# Patient Record
Sex: MALE | Race: BLACK OR AFRICAN AMERICAN | Employment: FULL TIME | URBAN - METROPOLITAN AREA
[De-identification: names, ages, dates, MRNs, and addresses within clinical notes are randomized per-mention and may not be internally consistent; named-entity substitution may affect disease eponyms.]

---

## 2018-02-15 ENCOUNTER — HOSPITAL ENCOUNTER (EMERGENCY)
Age: 28
Discharge: LEFT BEFORE DISCHARGE | End: 2018-02-15
Attending: EMERGENCY MEDICINE
Payer: COMMERCIAL

## 2018-02-15 VITALS
DIASTOLIC BLOOD PRESSURE: 75 MMHG | SYSTOLIC BLOOD PRESSURE: 154 MMHG | BODY MASS INDEX: 25.92 KG/M2 | HEIGHT: 73 IN | RESPIRATION RATE: 16 BRPM | TEMPERATURE: 98.4 F | WEIGHT: 195.55 LBS | OXYGEN SATURATION: 100 %

## 2018-02-15 LAB
GLUCOSE BLD STRIP.AUTO-MCNC: 87 MG/DL (ref 65–100)
SERVICE CMNT-IMP: NORMAL

## 2018-02-15 PROCEDURE — 82962 GLUCOSE BLOOD TEST: CPT

## 2018-02-15 PROCEDURE — 75810000275 HC EMERGENCY DEPT VISIT NO LEVEL OF CARE

## 2018-02-15 NOTE — ED TRIAGE NOTES
Called pt on phone number listed on facesheet Pt states he just left ED Pt aware that PA was ready to evaluate pt if he wished to come back in and be seen Pt states \"No, I had other things to take care of.\"

## 2019-02-08 ENCOUNTER — APPOINTMENT (OUTPATIENT)
Dept: ULTRASOUND IMAGING | Age: 29
End: 2019-02-08
Attending: EMERGENCY MEDICINE
Payer: SELF-PAY

## 2019-02-08 ENCOUNTER — HOSPITAL ENCOUNTER (EMERGENCY)
Age: 29
Discharge: HOME | End: 2019-02-08
Attending: EMERGENCY MEDICINE
Payer: SELF-PAY

## 2019-02-08 ENCOUNTER — OFFICE VISIT (OUTPATIENT)
Dept: URGENT CARE | Age: 29
End: 2019-02-08

## 2019-02-08 VITALS
TEMPERATURE: 98.3 F | OXYGEN SATURATION: 100 % | SYSTOLIC BLOOD PRESSURE: 136 MMHG | WEIGHT: 192.46 LBS | HEIGHT: 72 IN | DIASTOLIC BLOOD PRESSURE: 66 MMHG | RESPIRATION RATE: 16 BRPM | HEART RATE: 63 BPM | BODY MASS INDEX: 26.07 KG/M2

## 2019-02-08 VITALS
DIASTOLIC BLOOD PRESSURE: 75 MMHG | HEART RATE: 86 BPM | BODY MASS INDEX: 25.45 KG/M2 | SYSTOLIC BLOOD PRESSURE: 131 MMHG | RESPIRATION RATE: 18 BRPM | TEMPERATURE: 97.5 F | HEIGHT: 73 IN | WEIGHT: 192 LBS | OXYGEN SATURATION: 97 %

## 2019-02-08 DIAGNOSIS — N45.1 EPIDIDYMITIS: Primary | ICD-10-CM

## 2019-02-08 DIAGNOSIS — N50.811 PAIN IN RIGHT TESTICLE: Primary | ICD-10-CM

## 2019-02-08 LAB
APPEARANCE UR: CLEAR
BILIRUB UR QL STRIP: NEGATIVE
BILIRUB UR QL: NEGATIVE
COLOR UR: NORMAL
GLUCOSE UR STRIP.AUTO-MCNC: NEGATIVE MG/DL
GLUCOSE UR-MCNC: NEGATIVE MG/DL
HGB UR QL STRIP: NEGATIVE
KETONES P FAST UR STRIP-MCNC: NEGATIVE MG/DL
KETONES UR QL STRIP.AUTO: NEGATIVE MG/DL
LEUKOCYTE ESTERASE UR QL STRIP.AUTO: NEGATIVE
NITRITE UR QL STRIP.AUTO: NEGATIVE
PH UR STRIP: 6 [PH] (ref 4.6–8)
PH UR STRIP: 7.5 [PH] (ref 5–8)
PROT UR QL STRIP: NORMAL
PROT UR STRIP-MCNC: NEGATIVE MG/DL
SP GR UR REFRACTOMETRY: 1.01 (ref 1–1.03)
SP GR UR STRIP: 1.02 (ref 1–1.03)
UA UROBILINOGEN AMB POC: NORMAL (ref 0.2–1)
URINALYSIS CLARITY POC: NORMAL
URINALYSIS COLOR POC: NORMAL
URINE BLOOD POC: NORMAL
URINE LEUKOCYTES POC: NORMAL
URINE NITRITES POC: NEGATIVE
UROBILINOGEN UR QL STRIP.AUTO: 1 EU/DL (ref 0.2–1)

## 2019-02-08 PROCEDURE — 74011250636 HC RX REV CODE- 250/636: Performed by: EMERGENCY MEDICINE

## 2019-02-08 PROCEDURE — 76870 US EXAM SCROTUM: CPT

## 2019-02-08 PROCEDURE — 87491 CHLMYD TRACH DNA AMP PROBE: CPT

## 2019-02-08 PROCEDURE — 74011250637 HC RX REV CODE- 250/637: Performed by: EMERGENCY MEDICINE

## 2019-02-08 PROCEDURE — 99283 EMERGENCY DEPT VISIT LOW MDM: CPT

## 2019-02-08 PROCEDURE — 81003 URINALYSIS AUTO W/O SCOPE: CPT

## 2019-02-08 PROCEDURE — 96372 THER/PROPH/DIAG INJ SC/IM: CPT

## 2019-02-08 PROCEDURE — 87086 URINE CULTURE/COLONY COUNT: CPT

## 2019-02-08 RX ORDER — KETOROLAC TROMETHAMINE 10 MG/1
10 TABLET, FILM COATED ORAL
Qty: 20 TAB | Refills: 0 | Status: SHIPPED | OUTPATIENT
Start: 2019-02-08

## 2019-02-08 RX ORDER — AZITHROMYCIN 250 MG/1
1000 TABLET, FILM COATED ORAL
Status: COMPLETED | OUTPATIENT
Start: 2019-02-08 | End: 2019-02-08

## 2019-02-08 RX ORDER — LEVOFLOXACIN 500 MG/1
500 TABLET, FILM COATED ORAL DAILY
Qty: 10 TAB | Refills: 0 | Status: SHIPPED | OUTPATIENT
Start: 2019-02-08

## 2019-02-08 RX ADMIN — LIDOCAINE HYDROCHLORIDE 250 MG: 10 INJECTION, SOLUTION EPIDURAL; INFILTRATION; INTRACAUDAL; PERINEURAL at 16:00

## 2019-02-08 RX ADMIN — AZITHROMYCIN 1000 MG: 250 TABLET, FILM COATED ORAL at 15:59

## 2019-02-08 NOTE — ED PROVIDER NOTES
HPI 
 
Pt is a 34 y.o. M with no PMH presenting to ED ith c/o right testicle pain x 2 days. Pt's pain started after working out and has been intermittent since that time. Pain mostly when standing and walking. It eases somewhat when he is sitting. In addition to pain, he has noticed swelling of right testicle. He has not taken anything for pain. He says its not unbearable. He denies direct injury to testicle. No penile discharge, no hematuria, denies chance of STD. No dysuria or other urinary sx. No abdominal pain. No h/o epididymitis or orchitis. No other complaints at this time. He was seen at urgent care facility [facility] and had normal urinalysis. Cx sent and GC/CZ sent. Pt advised to come to ED for further work up. History reviewed. No pertinent past medical history. History reviewed. No pertinent surgical history. History reviewed. No pertinent family history. Social History Socioeconomic History  Marital status: SINGLE Spouse name: Not on file  Number of children: Not on file  Years of education: Not on file  Highest education level: Not on file Social Needs  Financial resource strain: Not on file  Food insecurity - worry: Not on file  Food insecurity - inability: Not on file  Transportation needs - medical: Not on file  Transportation needs - non-medical: Not on file Occupational History  Not on file Tobacco Use  Smoking status: Never Smoker  Smokeless tobacco: Never Used Substance and Sexual Activity  Alcohol use: Yes Comment: 2x a week  Drug use: No  
 Sexual activity: Not on file Other Topics Concern  Not on file Social History Narrative  Not on file ALLERGIES: [place] Review of Systems Constitutional: Negative for chills and fever. Cardiovascular: Negative for leg swelling. Gastrointestinal: Negative for abdominal pain. Genitourinary: Positive for scrotal swelling and testicular pain. Negative for decreased urine volume, difficulty urinating, discharge, dysuria, flank pain, frequency, genital sores, hematuria, penile pain, penile swelling and urgency. Musculoskeletal: Negative for back pain. All other systems reviewed and are negative. Vitals:  
 02/08/19 1323 BP: 133/81 Pulse: 67 Resp: 16 Temp: 97.9 °F (36.6 °C) SpO2: 100% Weight: 87.3 kg (192 lb 7.4 oz) Height: 6' 0.05\" (1.83 m) Physical Exam  
Constitutional: He is oriented to person, place, and time. He appears well-developed. No distress. Cardiovascular: Normal rate and regular rhythm. Pulmonary/Chest: Effort normal and breath sounds normal.  
Abdominal: Soft. Bowel sounds are normal. There is no tenderness. There is no rigidity and no guarding. Genitourinary: Penis normal. Right testis shows swelling and tenderness. No penile tenderness. Genitourinary Comments: High riding right testicle Musculoskeletal: Normal range of motion. He exhibits no edema or tenderness. Lymphadenopathy:  
     Right: No inguinal adenopathy present. Left: No inguinal adenopathy present. Neurological: He is alert and oriented to person, place, and time. Skin: Skin is warm. Capillary refill takes less than 2 seconds. No rash noted. Vitals reviewed. MDM Number of Diagnoses or Management Options Epididymitis:  
 
  
 
Procedures Cx sent for urine and GC/CZ. Pt treated for STD and enteric causes. He is advised to follow up with PCP and urology. Get partners tested for STD as well. He is advised to return to ED if pain worsens, testicular swelling worsens or change or he notices discoloration or any medical concern arises.  
 
Brian Black MD

## 2019-02-08 NOTE — PROGRESS NOTES
Right testicle pain  8/10 ache  Onset after workout 2 days ago  Denies heavy lifting. Did sprints and stretching/dynamic workout  Pain worse with walking improved at rest or with cradling testicle. Denies pain with valsalva. Concerned about \"twisted testicle\"  Denies any penile discharge, burning with urination, abdominal pain or penile lesions, or urinary frequency. Feels well otherwise  Is requesting GC chlamydia screening               History reviewed. No pertinent past medical history. History reviewed. No pertinent surgical history. History reviewed. No pertinent family history. Social History     Socioeconomic History    Marital status: SINGLE     Spouse name: Not on file    Number of children: Not on file    Years of education: Not on file    Highest education level: Not on file   Social Needs    Financial resource strain: Not on file    Food insecurity - worry: Not on file    Food insecurity - inability: Not on file    Transportation needs - medical: Not on file   [employer] needs - non-medical: Not on file   Occupational History    Not on file   Tobacco Use    Smoking status: Never Smoker    Smokeless tobacco: Never Used   Substance and Sexual Activity    Alcohol use: Yes    Drug use: Not on file    Sexual activity: Not on file   Other Topics Concern    Not on file   Social History Narrative    Not on file                ALLERGIES: [place]    Review of Systems   All other systems reviewed and are negative. Vitals:    02/08/19 1209   BP: 131/75   Pulse: 86   Resp: 18   Temp: 97.5 °F (36.4 °C)   SpO2: 97%   Weight: 192 lb (87.1 kg)   Height: 6' 1\" (1.854 m)       Physical Exam   Constitutional: He is oriented to person, place, and time. He appears distressed. HENT:   Mouth/Throat: Oropharynx is clear and moist.   Eyes: EOM are normal.   Cardiovascular: Normal rate, regular rhythm and normal heart sounds.    Pulmonary/Chest: Effort normal and breath sounds normal.   Genitourinary:   Genitourinary Comments:   Right testicle appears high riding. Size R>L  Cremasteric reflex absent. Pain alleviated with lifting up on scrotum  Tender palpable mass inferior pole of testice. No penile discharge or lesion. No hernia appreciated. No epididymal congestion/swelling. Lymphadenopathy:        Right: No inguinal adenopathy present. Left: No inguinal adenopathy present. Neurological: He is alert and oriented to person, place, and time. Psychiatric: He has a normal mood and affect.  His behavior is normal. Thought content normal.       MDM     Differential Diagnosis; Clinical Impression; Plan:       CLINICAL IMPRESSION:  (N50.811) Pain in right testicle  (primary encounter diagnosis)      Plan:  Concern for possible intermittent torsion; have advised ED eval immediately; list of locations given  DDX include; cyst, inguinal hernia  Will send GC chlamydia per request  Urine culture ordered                Procedures

## 2019-02-08 NOTE — PATIENT INSTRUCTIONS
Go to one of the following below immediately without delay:    [address]   ([address])  Timothy Duque   [phone]   Open 24 hours    [facility]   ([address])  [phone] 24 hours    [facility]  [address])  [address], [address] [address], [address] [place] [address]   [phone]

## 2019-02-08 NOTE — LETTER
[address] 
[facility] 
[address] [address] [id number] 
[phone] Work/School Note Date: 2/8/2019 To Whom It May concern: 
 
Anna Wharton was seen and treated today in the office by Dr. Trejo Serum Anna Wharton may return to work on 02/10/2019.  
 
Sincerely, 
 
 
 
 
Cat Palacios MD

## 2019-02-08 NOTE — DISCHARGE INSTRUCTIONS
Patient Education        Epididymitis and Orchitis: Care Instructions  Your Care Instructions    Epididymitis is pain and swelling of the tube that is attached to each testicle. This tube is called the epididymis. Orchitis is pain and swelling of the testicle. Infection with bacteria often causes these conditions. Sexually transmitted infections (STIs) also can cause both conditions. This is often the case in men younger than 28. Other causes in boys and older men are infections from surgery or having a catheter that drains urine. The mumps virus also can cause orchitis. Anti-inflammatory or pain medicines can help with the pain. Antibiotics are used if the problem is caused by bacteria. They are not used if a virus is the cause. Your testicle may stay swollen for many days or even a few weeks. The doctor has checked you carefully, but problems can develop later. If you notice any problems or new symptoms, get medical treatment right away. Follow-up care is a key part of your treatment and safety. Be sure to make and go to all appointments, and call your doctor if you are having problems. It's also a good idea to know your test results and keep a list of the medicines you take. How can you care for yourself at home? · If your doctor prescribed antibiotics, take them as directed. Do not stop taking them just because you feel better. You need to take the full course of antibiotics. · Ask your doctor if you can take an over-the-counter pain medicine, such as acetaminophen (Tylenol), ibuprofen (Advil, Motrin), or naproxen (Aleve). Be safe with medicines. Read and follow all instructions on the label. · Limit your activity to what is comfortable. · Wear snug underwear or an athletic supporter. This can help reduce pain. · Apply either cold or heat to the swollen area. Use the one that works best for your pain. Sitting in a warm bath for 15 minutes twice a day will help reduce the swelling more quickly.   · If you have been told that an STI may have caused your condition, do not have sex until your doctor says it is safe. This will prevent spreading the infection. Tell your sex partner or partners that they need to be checked. They may need treatment. When should you call for help? Call your doctor now or seek immediate medical care if:    · Your pain gets worse.     · You have a new or higher fever.     · You have new or more swelling of your testicle.     · You have new belly pain, or your pain gets worse.    Watch closely for changes in your health, and be sure to contact your doctor if:    · You do not get better as expected. Where can you learn more? Go to http://celeste-janay.info/. Enter S360 in the search box to learn more about \"Epididymitis and Orchitis: Care Instructions. \"  Current as of: March 20, 2018  Content Version: 11.9  © 9014-4727 [employer]. Care instructions adapted under license by [employer] (which disclaims liability or warranty for this information). If you have questions about a medical condition or this instruction, always ask your healthcare professional. [address] any warranty or liability for your use of this information.

## 2019-02-08 NOTE — ED TRIAGE NOTES
Triage Note: Patient arrives to ER complaining of pain to his groin and swelling to his testicle. Pt states he was lifting at the gym Monday, pain started yesterday.

## 2019-02-10 LAB
BACTERIA SPEC CULT: NORMAL
BACTERIA UR CULT: NO GROWTH
CC UR VC: NORMAL
SERVICE CMNT-IMP: NORMAL

## 2019-02-11 LAB
C TRACH DNA SPEC QL NAA+PROBE: POSITIVE
C TRACH RRNA SPEC QL NAA+PROBE: POSITIVE
N GONORRHOEA DNA SPEC QL NAA+PROBE: NEGATIVE
N GONORRHOEA RRNA SPEC QL NAA+PROBE: NEGATIVE
SAMPLE TYPE: ABNORMAL
SERVICE CMNT-IMP: ABNORMAL
SPECIMEN SOURCE: ABNORMAL

## 2019-02-11 RX ORDER — DOXYCYCLINE 100 MG/1
100 CAPSULE ORAL 2 TIMES DAILY
Qty: 20 CAP | Refills: 0 | Status: SHIPPED | OUTPATIENT
Start: 2019-02-11